# Patient Record
Sex: MALE
[De-identification: names, ages, dates, MRNs, and addresses within clinical notes are randomized per-mention and may not be internally consistent; named-entity substitution may affect disease eponyms.]

---

## 2019-01-09 ENCOUNTER — HOSPITAL ENCOUNTER (EMERGENCY)
Dept: HOSPITAL 31 - C.ER | Age: 29
Discharge: HOME | End: 2019-01-09
Payer: COMMERCIAL

## 2019-01-09 VITALS
HEART RATE: 66 BPM | SYSTOLIC BLOOD PRESSURE: 135 MMHG | DIASTOLIC BLOOD PRESSURE: 74 MMHG | TEMPERATURE: 97.9 F | RESPIRATION RATE: 16 BRPM

## 2019-01-09 VITALS — OXYGEN SATURATION: 97 %

## 2019-01-09 DIAGNOSIS — L03.113: Primary | ICD-10-CM

## 2019-01-09 LAB
ALBUMIN SERPL-MCNC: 3.9 G/DL (ref 3.5–5)
ALBUMIN/GLOB SERPL: 1.1 {RATIO} (ref 1–2.1)
ALT SERPL-CCNC: 66 U/L (ref 21–72)
AST SERPL-CCNC: 44 U/L (ref 17–59)
BASOPHILS # BLD AUTO: 0.1 K/UL (ref 0–0.2)
BASOPHILS NFR BLD: 1.2 % (ref 0–2)
BUN SERPL-MCNC: 19 MG/DL (ref 9–20)
CALCIUM SERPL-MCNC: 8.8 MG/DL (ref 8.6–10.4)
EOSINOPHIL # BLD AUTO: 0.2 K/UL (ref 0–0.7)
EOSINOPHIL NFR BLD: 2.9 % (ref 0–4)
ERYTHROCYTE [DISTWIDTH] IN BLOOD BY AUTOMATED COUNT: 13.2 % (ref 11.5–14.5)
GFR NON-AFRICAN AMERICAN: > 60
HGB BLD-MCNC: 13 G/DL (ref 12–18)
LYMPHOCYTES # BLD AUTO: 1.8 K/UL (ref 1–4.3)
LYMPHOCYTES NFR BLD AUTO: 24.1 % (ref 20–40)
MCH RBC QN AUTO: 29.3 PG (ref 27–31)
MCHC RBC AUTO-ENTMCNC: 33.6 G/DL (ref 33–37)
MCV RBC AUTO: 87 FL (ref 80–94)
MONOCYTES # BLD: 0.5 K/UL (ref 0–0.8)
MONOCYTES NFR BLD: 6.7 % (ref 0–10)
NEUTROPHILS # BLD: 5 K/UL (ref 1.8–7)
NEUTROPHILS NFR BLD AUTO: 65.1 % (ref 50–75)
NRBC BLD AUTO-RTO: 0 % (ref 0–2)
PLATELET # BLD: 230 K/UL (ref 130–400)
PMV BLD AUTO: 8.7 FL (ref 7.2–11.7)
RBC # BLD AUTO: 4.45 MIL/UL (ref 4.4–5.9)
WBC # BLD AUTO: 7.7 K/UL (ref 4.8–10.8)

## 2019-01-09 NOTE — C.PDOC
History Of Present Illness





27 y/o male, in police custody, comes in complaining of bilateral forearm and 

right hand pain. Patient admits to using intravenous drugs. Denies fever. 

Patient was seen in another hospital 3-4 weeks ago but left prior to completion 

of treatment. Patient did not seek medical attention after that ED visit. Denies

any other associated symptoms. 





Chief Complaint (Nursing): Upper Extremity Problem/Injury


History Per: Patient


History/Exam Limitations: no limitations


Onset/Duration Of Symptoms: Days


Current Symptoms Are (Timing): Still Present





Past Medical History


Reviewed: Historical Data, Nursing Documentation, Vital Signs


Vital Signs: 





                                Last Vital Signs











Temp  97.8 F   01/09/19 11:54


 


Pulse  65   01/09/19 11:54


 


Resp  17   01/09/19 11:54


 


BP  136/78   01/09/19 11:54


 


Pulse Ox  97   01/09/19 11:54











Family History: States: No Known Family Hx





- Social History


Hx Alcohol Use: Yes


Hx Substance Use: Yes





Review Of Systems


Except As Marked, All Systems Reviewed And Found Negative.


Constitutional: Negative for: Fever, Chills


Cardiovascular: Negative for: Chest Pain


Respiratory: Negative for: Shortness of Breath


Gastrointestinal: Negative for: Abdominal Pain


Musculoskeletal: Positive for: Hand Pain (Right), Other (Bilateral forearm pain)


Skin: Negative for: Rash


Neurological: Negative for: Weakness, Numbness





Physical Exam





- Physical Exam


Appears: Non-toxic, No Acute Distress


Skin: Warm, Dry


Head: Atraumatic, Normacephalic


Eye(s): bilateral: Normal Inspection


Oral Mucosa: Moist


Neck: Supple


Chest: Symmetrical


Cardiovascular: Rhythm Regular, No Murmur


Respiratory: Normal Breath Sounds, No Rales, No Rhonchi, No Wheezing


Gastrointestinal/Abdominal: Soft, No Tenderness


Extremity: Capillary Refill (less than 2 seconds), Other (Right hand mildly 

erythematous and warm to touch)


Extremity: Bilateral: Other (+track marks on both forearms, no signs of drainage

or infection)


Pulses: Left Radial: Normal, Right Radial: Normal


Neurological/Psych: Oriented x3, Normal Speech





ED Course And Treatment





- Laboratory Results


Result Diagrams: 


                                 01/09/19 13:59





                                 01/09/19 13:59


Lab Results: 





                                        











Total Bilirubin  0.6 mg/dL (0.2-1.3)   01/09/19  13:59    


 


AST  44 U/L (17-59)   01/09/19  13:59    


 


ALT  66 U/L (21-72)   01/09/19  13:59    


 


Alkaline Phosphatase  75 U/L ()   01/09/19  13:59    


 


Total Protein  7.3 g/dL (6.3-8.3)   01/09/19  13:59    


 


Albumin  3.9 g/dL (3.5-5.0)   01/09/19  13:59    


 


Globulin  3.4 gm/dL (2.2-3.9)   01/09/19  13:59    


 


Albumin/Globulin Ratio  1.1  (1.0-2.1)   01/09/19  13:59    











O2 Sat by Pulse Oximetry: 97 (RA)


Pulse Ox Interpretation: Normal





Medical Decision Making


Medical Decision Making: 





Plan:


--Labs


--Bactrim PO


--Keflex PO








Disposition





- Disposition


Referrals: 


[facility] [Outside]


[facility] [Outside]


Disposition: HOME/ ROUTINE


Disposition Time: 14:30


Condition: GOOD


Additional Instructions: 





CJ LIGHT, thank you for letting us take care of you today. The emergency 

medical care you received today was directed at your acute symptoms. If you were

 prescribed any medication, please fill it and take as directed. It may take 

several days for your symptoms to resolve. Return to the Emergency Department if

 your symptoms worsen, do not improve, or if you have any other problems.





Please contact your doctor or call one of the physicians/clinics you have been 

referred to that are listed on the Patient Visit Information form that is 

included in your discharge packet. Bring any paperwork you were given at 

discharge with you along with any medications you are taking to your follow up 

visit. Our treatment cannot replace ongoing medical care by a primary care 

provider outside of the emergency department.





Thank you for allowing the [facility] team to be part of your care today.











PATIENT IS MEDICALLY AND PSYCHIATRICALLY CLEARED FOR INCARCERATION.











Follow up with the clinic in 5-7 days for re-evaluation and further management.


Prescriptions: 


Cephalexin [Keflex] 500 mg PO TID #21 capsule


Sulfamethoxazole/Trimethoprim [Bactrim  mg-160 mg] 1 tab PO BID #14 tab


Instructions:  Drug Abuse and Drug Addiction (DC), Cellulitis (Skin Infection), 

Adult (DC)


Forms:  [facility] (English)





- Clinical Impression


Clinical Impression: 


 Cellulitis








- Scribe Statement


The provider has reviewed the documentation as recorded by the Andrezibalexandru Reyes





Provider Attestation: 





All medical record entries made by the Andrezibe were at my direction and 

personally dictated by me. I have reviewed the chart and agree that the record 

accurately reflects my personal performance of the history, physical exam, 

medical decision making, and the department course for this patient. I have also

personally directed, reviewed, and agree with the discharge instructions and 

disposition.